# Patient Record
Sex: MALE | Race: OTHER | HISPANIC OR LATINO | URBAN - METROPOLITAN AREA
[De-identification: names, ages, dates, MRNs, and addresses within clinical notes are randomized per-mention and may not be internally consistent; named-entity substitution may affect disease eponyms.]

---

## 2022-06-13 ENCOUNTER — EMERGENCY (EMERGENCY)
Facility: HOSPITAL | Age: 22
LOS: 1 days | Discharge: HOME | End: 2022-06-13
Attending: EMERGENCY MEDICINE | Admitting: EMERGENCY MEDICINE
Payer: SELF-PAY

## 2022-06-13 VITALS
HEART RATE: 90 BPM | WEIGHT: 139.99 LBS | OXYGEN SATURATION: 100 % | RESPIRATION RATE: 17 BRPM | TEMPERATURE: 99 F | SYSTOLIC BLOOD PRESSURE: 126 MMHG | DIASTOLIC BLOOD PRESSURE: 72 MMHG

## 2022-06-13 DIAGNOSIS — R11.2 NAUSEA WITH VOMITING, UNSPECIFIED: ICD-10-CM

## 2022-06-13 DIAGNOSIS — R42 DIZZINESS AND GIDDINESS: ICD-10-CM

## 2022-06-13 DIAGNOSIS — R51.9 HEADACHE, UNSPECIFIED: ICD-10-CM

## 2022-06-13 LAB
ALBUMIN SERPL ELPH-MCNC: 5.2 G/DL — HIGH (ref 3.3–5)
ALP SERPL-CCNC: 95 U/L — SIGNIFICANT CHANGE UP (ref 40–120)
ALT FLD-CCNC: 21 U/L — SIGNIFICANT CHANGE UP (ref 10–45)
ANION GAP SERPL CALC-SCNC: 13 MMOL/L — SIGNIFICANT CHANGE UP (ref 5–17)
AST SERPL-CCNC: 19 U/L — SIGNIFICANT CHANGE UP (ref 10–40)
BASOPHILS # BLD AUTO: 0.04 K/UL — SIGNIFICANT CHANGE UP (ref 0–0.2)
BASOPHILS NFR BLD AUTO: 0.4 % — SIGNIFICANT CHANGE UP (ref 0–2)
BILIRUB SERPL-MCNC: 0.7 MG/DL — SIGNIFICANT CHANGE UP (ref 0.2–1.2)
BUN SERPL-MCNC: 8 MG/DL — SIGNIFICANT CHANGE UP (ref 7–23)
CALCIUM SERPL-MCNC: 9.5 MG/DL — SIGNIFICANT CHANGE UP (ref 8.4–10.5)
CHLORIDE SERPL-SCNC: 105 MMOL/L — SIGNIFICANT CHANGE UP (ref 96–108)
CO2 SERPL-SCNC: 27 MMOL/L — SIGNIFICANT CHANGE UP (ref 22–31)
CREAT SERPL-MCNC: 0.75 MG/DL — SIGNIFICANT CHANGE UP (ref 0.5–1.3)
EGFR: 132 ML/MIN/1.73M2 — SIGNIFICANT CHANGE UP
EOSINOPHIL # BLD AUTO: 0.05 K/UL — SIGNIFICANT CHANGE UP (ref 0–0.5)
EOSINOPHIL NFR BLD AUTO: 0.5 % — SIGNIFICANT CHANGE UP (ref 0–6)
GLUCOSE SERPL-MCNC: 91 MG/DL — SIGNIFICANT CHANGE UP (ref 70–99)
HCT VFR BLD CALC: 44.2 % — SIGNIFICANT CHANGE UP (ref 39–50)
HGB BLD-MCNC: 15.3 G/DL — SIGNIFICANT CHANGE UP (ref 13–17)
IMM GRANULOCYTES NFR BLD AUTO: 0.1 % — SIGNIFICANT CHANGE UP (ref 0–1.5)
LYMPHOCYTES # BLD AUTO: 1.48 K/UL — SIGNIFICANT CHANGE UP (ref 1–3.3)
LYMPHOCYTES # BLD AUTO: 16.1 % — SIGNIFICANT CHANGE UP (ref 13–44)
MCHC RBC-ENTMCNC: 31 PG — SIGNIFICANT CHANGE UP (ref 27–34)
MCHC RBC-ENTMCNC: 34.6 GM/DL — SIGNIFICANT CHANGE UP (ref 32–36)
MCV RBC AUTO: 89.7 FL — SIGNIFICANT CHANGE UP (ref 80–100)
MONOCYTES # BLD AUTO: 0.48 K/UL — SIGNIFICANT CHANGE UP (ref 0–0.9)
MONOCYTES NFR BLD AUTO: 5.2 % — SIGNIFICANT CHANGE UP (ref 2–14)
NEUTROPHILS # BLD AUTO: 7.14 K/UL — SIGNIFICANT CHANGE UP (ref 1.8–7.4)
NEUTROPHILS NFR BLD AUTO: 77.7 % — HIGH (ref 43–77)
NRBC # BLD: 0 /100 WBCS — SIGNIFICANT CHANGE UP (ref 0–0)
PLATELET # BLD AUTO: 238 K/UL — SIGNIFICANT CHANGE UP (ref 150–400)
POTASSIUM SERPL-MCNC: 4.2 MMOL/L — SIGNIFICANT CHANGE UP (ref 3.5–5.3)
POTASSIUM SERPL-SCNC: 4.2 MMOL/L — SIGNIFICANT CHANGE UP (ref 3.5–5.3)
PROT SERPL-MCNC: 7.7 G/DL — SIGNIFICANT CHANGE UP (ref 6–8.3)
RBC # BLD: 4.93 M/UL — SIGNIFICANT CHANGE UP (ref 4.2–5.8)
RBC # FLD: 12.3 % — SIGNIFICANT CHANGE UP (ref 10.3–14.5)
SODIUM SERPL-SCNC: 145 MMOL/L — SIGNIFICANT CHANGE UP (ref 135–145)
WBC # BLD: 9.2 K/UL — SIGNIFICANT CHANGE UP (ref 3.8–10.5)
WBC # FLD AUTO: 9.2 K/UL — SIGNIFICANT CHANGE UP (ref 3.8–10.5)

## 2022-06-13 PROCEDURE — 85025 COMPLETE CBC W/AUTO DIFF WBC: CPT

## 2022-06-13 PROCEDURE — 99284 EMERGENCY DEPT VISIT MOD MDM: CPT | Mod: 25

## 2022-06-13 PROCEDURE — 36415 COLL VENOUS BLD VENIPUNCTURE: CPT

## 2022-06-13 PROCEDURE — 80053 COMPREHEN METABOLIC PANEL: CPT

## 2022-06-13 PROCEDURE — 71046 X-RAY EXAM CHEST 2 VIEWS: CPT | Mod: 26

## 2022-06-13 PROCEDURE — 96374 THER/PROPH/DIAG INJ IV PUSH: CPT

## 2022-06-13 PROCEDURE — 71046 X-RAY EXAM CHEST 2 VIEWS: CPT

## 2022-06-13 RX ORDER — METOCLOPRAMIDE HCL 10 MG
10 TABLET ORAL ONCE
Refills: 0 | Status: DISCONTINUED | OUTPATIENT
Start: 2022-06-13 | End: 2022-06-13

## 2022-06-13 RX ORDER — SODIUM CHLORIDE 9 MG/ML
1000 INJECTION INTRAMUSCULAR; INTRAVENOUS; SUBCUTANEOUS ONCE
Refills: 0 | Status: COMPLETED | OUTPATIENT
Start: 2022-06-13 | End: 2022-06-13

## 2022-06-13 RX ORDER — METOCLOPRAMIDE HCL 10 MG
10 TABLET ORAL ONCE
Refills: 0 | Status: COMPLETED | OUTPATIENT
Start: 2022-06-13 | End: 2022-06-13

## 2022-06-13 RX ADMIN — Medication 104 MILLIGRAM(S): at 12:26

## 2022-06-13 RX ADMIN — SODIUM CHLORIDE 1000 MILLILITER(S): 9 INJECTION INTRAMUSCULAR; INTRAVENOUS; SUBCUTANEOUS at 12:26

## 2022-06-13 NOTE — ED PROVIDER NOTE - NS ED ATTENDING STATEMENT MOD
This was a shared visit with the MARYANN. I reviewed and verified the documentation and independently performed the documented:

## 2022-06-13 NOTE — ED PROVIDER NOTE - CLINICAL SUMMARY MEDICAL DECISION MAKING FREE TEXT BOX
22 y/o m presents c/o n/v over the past 3 days, lightheadedness and headache over the past 2 days.  Pt afebrile in ED, vss, neuro exam intact.  Labs unremarkable, sx improved with reglan, IV fluid.  Pt stable for d/c, will continue oral hydration, return to ED if sx worsen.

## 2022-06-13 NOTE — ED ADULT NURSE NOTE - CHIEF COMPLAINT QUOTE
Pt presents to ED c/o headache, diarrhea, nausea x 3 days. Denies fevers, chills, sore throat, cough, dizziness, cp.  #[id number].

## 2022-06-13 NOTE — ED PROVIDER NOTE - PATIENT PORTAL LINK FT
You can access the FollowMyHealth Patient Portal offered by [facility] by registering at the following website: [email or web address]. By joining [employer]’s FollowMyHealth portal, you will also be able to view your health information using other applications (apps) compatible with our system.

## 2022-06-13 NOTE — ED PROVIDER NOTE - NSFOLLOWUPINSTRUCTIONS_ED_ALL_ED_FT
Mareos    Dizziness      Los mareos son un problema muy frecuente. Causan sensación de inestabilidad o de desvanecimiento. Puede sentir que se va a desmayar. Los mareos pueden provocarle calvin lesión si se tropieza o se . La causa puede deberse a muchos problemas, tales alireza los siguientes:  •Medicamentos.      •No tener suficiente agua en el cuerpo (deshidratación).      •Enfermedad.        Siga estas instrucciones en petersen casa:      Comida y bebida   A comparison of three sample cups showing dark yellow, yellow, and pale yellow urine.   •Selena suficiente líquido para mantener el pis (orina) de color amarillo pálido. [place] nadia la deshidratación. Trate de beber más líquidos transparentes, alireza agua.      • No selena alcohol.      •Limite la cantidad de cafeína que francisco o come si el médico se lo indica.      •Limite la cantidad de sal (sodio) que francisco o come si el médico se lo indica.        Actividad   A sign showing that a person should not drive. •Evite los movimientos rápidos.  •Cuando se levante de calvin silla, hágalo con lentitud y sujétese hasta sentirse madison.      •Por la mañana, siéntese abiola a un lado de la cama. Cuando se sienta madison, póngase lentamente de pie mientras se sostiene de algo. Ángela esto hasta que se sienta seguro en cuanto al equilibrio.        •Mueva las piernas con frecuencia si debe estar de pie en un lugar jessa mucho tiempo. Mientras esté de pie, contraiga y relaje los músculos de las piernas.      • No conduzca vehículos ni opere maquinaria si se siente mareado.      •Evite agacharse si se siente mareado. En petersen casa, coloque los objetos en algún lugar que le resulte fácil alcanzarlos sin agacharse.      Estilo de rachid     • No fume ni consuma ningún producto que contenga nicotina o tabaco. Si necesita ayuda para dejar de fumar, consulte al médico.      •Intente bajar el nivel de estrés. Para hacerlo, puede usar métodos alireza el yoga o la meditación. Hable con el médico si necesita ayuda.      Instrucciones generales     •Controle timothy mareos para leighann si hay cambios.      •Use los medicamentos de venta quinton y los recetados solamente alireza se lo haya indicado el médico. Hable con el médico si christel que la causa de timothy mareos es algún medicamento que está tomando.      •Infórmele a un amigo o a un familiar si se siente mareado. Pídale a esta persona que llame al médico si observa cambios en petersen comportamiento.      •Concurra a todas las visitas de seguimiento.        Comuníquese con un médico si:    •Los mareos persisten.      •Los mareos o la sensación de desvanecimiento empeoran.      •Tiene ganas de vomitar (náuseas).      •Tiene problemas para escuchar.      •Aparecen nuevos síntomas.      •Siente inestabilidad al estar de pie.      •Siente que la habitación da vueltas.      •Dolor o rigidez en el farzad.      •Tiene fiebre.        Solicite ayuda de inmediato si:    •Vomita o tiene heces acuosas (diarrea), y no puede comer o beber nada.    •Tiene dificultad para hacer lo siguiente:  •Hablar.      •Caminar.      •Tragar.      •Usar los brazos, las faustina o las piernas.        •Se siente constantemente débil.      •No piensa con claridad o tiene dificultad para armar oraciones. Es posible que un amigo o un familiar adviertan que esto ocurre.    •Tiene los siguientes síntomas:  •Dolor en el pecho.      •Dolor en el vientre (abdomen).      •Falta de aire.      •Sudoración.        •Presenta cambios en la visión.      •Sangrado.      •Tiene un dolor de martha muy intenso.      Estos síntomas pueden indicar calvin emergencia. Solicite ayuda de inmediato. Comuníquese con el servicio de emergencias de petersen localidad (911 en los [place]).   • No espere a leighann si los síntomas desaparecen.        • No conduzca por timothy propios medios hasta el hospital.         Resumen    •Los mareos causan sensación de inestabilidad o de desvanecimiento. Puede sentir que se va a desmayar.      •Selena suficiente líquido para mantener el pis (orina) de color amarillo pálido. No selena alcohol.      •Evite los movimientos rápidos si se siente mareado.      •Controle timothy mareos para leighann si hay cambios.      Esta información no tiene alireza fin reemplazar el consejo del médico. Asegúrese de hacerle al médico cualquier pregunta que tenga.

## 2022-06-13 NOTE — ED PROVIDER NOTE - ATTENDING APP SHARED VISIT CONTRIBUTION OF CARE
Vitals wnl, exam as above. Albumin 5.2, other labs grossly wnl. CXR wnl. Received IVF/reglan, feeling much better.   Discussed importance of outpt follow up and return precautions. Clinically no indication for further emergent ED workup or hospitalization at this time. Comfortable for dc, outpt f/u.

## 2022-06-13 NOTE — ED PROVIDER NOTE - OBJECTIVE STATEMENT
#[id number], [id number]    22 y/o m presents c/o n/v over the past 4 days.  Pt stating over the past 3 days, has been lightheaded, having intermittent headache as well.  Pt stating he hasn't been eating/drinking much because of his nausea, hasn't eaten anything today.  Pt reports was feeling lightheaded today, felt he may faint but didn't lose consciousness.  Denies fever, chills, CP, abd pain, diarrhea, all other ROS negative.

## 2022-06-13 NOTE — ED ADULT TRIAGE NOTE - CHIEF COMPLAINT QUOTE
Pt presents to ED c/o headache, diarrhea, nausea x 3 days. Denies fevers, chills, sore throat, cough, dizziness, cp.  #[id number].

## 2022-10-29 ENCOUNTER — EMERGENCY (EMERGENCY)
Facility: HOSPITAL | Age: 22
LOS: 1 days | Discharge: HOME | End: 2022-10-29
Attending: EMERGENCY MEDICINE | Admitting: EMERGENCY MEDICINE
Payer: SELF-PAY

## 2022-10-29 VITALS
WEIGHT: 160.06 LBS | RESPIRATION RATE: 16 BRPM | SYSTOLIC BLOOD PRESSURE: 160 MMHG | HEART RATE: 68 BPM | TEMPERATURE: 98 F | DIASTOLIC BLOOD PRESSURE: 82 MMHG | HEIGHT: 62.99 IN | OXYGEN SATURATION: 96 %

## 2022-10-29 VITALS
RESPIRATION RATE: 16 BRPM | SYSTOLIC BLOOD PRESSURE: 128 MMHG | OXYGEN SATURATION: 99 % | DIASTOLIC BLOOD PRESSURE: 79 MMHG | TEMPERATURE: 98 F | HEART RATE: 72 BPM

## 2022-10-29 LAB
ALBUMIN SERPL ELPH-MCNC: 5.3 G/DL — HIGH (ref 3.3–5)
ALP SERPL-CCNC: 125 U/L — HIGH (ref 40–120)
ALT FLD-CCNC: 25 U/L — SIGNIFICANT CHANGE UP (ref 10–45)
ANION GAP SERPL CALC-SCNC: 12 MMOL/L — SIGNIFICANT CHANGE UP (ref 5–17)
AST SERPL-CCNC: 25 U/L — SIGNIFICANT CHANGE UP (ref 10–40)
BASOPHILS # BLD AUTO: 0.02 K/UL — SIGNIFICANT CHANGE UP (ref 0–0.2)
BASOPHILS NFR BLD AUTO: 0.2 % — SIGNIFICANT CHANGE UP (ref 0–2)
BILIRUB SERPL-MCNC: 0.6 MG/DL — SIGNIFICANT CHANGE UP (ref 0.2–1.2)
BUN SERPL-MCNC: 6 MG/DL — LOW (ref 7–23)
CALCIUM SERPL-MCNC: 9.7 MG/DL — SIGNIFICANT CHANGE UP (ref 8.4–10.5)
CHLORIDE SERPL-SCNC: 101 MMOL/L — SIGNIFICANT CHANGE UP (ref 96–108)
CO2 SERPL-SCNC: 26 MMOL/L — SIGNIFICANT CHANGE UP (ref 22–31)
CREAT SERPL-MCNC: 0.74 MG/DL — SIGNIFICANT CHANGE UP (ref 0.5–1.3)
EGFR: 131 ML/MIN/1.73M2 — SIGNIFICANT CHANGE UP
EOSINOPHIL # BLD AUTO: 0.06 K/UL — SIGNIFICANT CHANGE UP (ref 0–0.5)
EOSINOPHIL NFR BLD AUTO: 0.7 % — SIGNIFICANT CHANGE UP (ref 0–6)
GLUCOSE SERPL-MCNC: 96 MG/DL — SIGNIFICANT CHANGE UP (ref 70–99)
HCT VFR BLD CALC: 42.8 % — SIGNIFICANT CHANGE UP (ref 39–50)
HGB BLD-MCNC: 14.7 G/DL — SIGNIFICANT CHANGE UP (ref 13–17)
IMM GRANULOCYTES NFR BLD AUTO: 0.2 % — SIGNIFICANT CHANGE UP (ref 0–0.9)
LIDOCAIN IGE QN: 30 U/L — SIGNIFICANT CHANGE UP (ref 7–60)
LYMPHOCYTES # BLD AUTO: 1.7 K/UL — SIGNIFICANT CHANGE UP (ref 1–3.3)
LYMPHOCYTES # BLD AUTO: 20.4 % — SIGNIFICANT CHANGE UP (ref 13–44)
MCHC RBC-ENTMCNC: 30.7 PG — SIGNIFICANT CHANGE UP (ref 27–34)
MCHC RBC-ENTMCNC: 34.3 GM/DL — SIGNIFICANT CHANGE UP (ref 32–36)
MCV RBC AUTO: 89.4 FL — SIGNIFICANT CHANGE UP (ref 80–100)
MONOCYTES # BLD AUTO: 0.86 K/UL — SIGNIFICANT CHANGE UP (ref 0–0.9)
MONOCYTES NFR BLD AUTO: 10.3 % — SIGNIFICANT CHANGE UP (ref 2–14)
NEUTROPHILS # BLD AUTO: 5.69 K/UL — SIGNIFICANT CHANGE UP (ref 1.8–7.4)
NEUTROPHILS NFR BLD AUTO: 68.2 % — SIGNIFICANT CHANGE UP (ref 43–77)
NRBC # BLD: 0 /100 WBCS — SIGNIFICANT CHANGE UP (ref 0–0)
PLATELET # BLD AUTO: 248 K/UL — SIGNIFICANT CHANGE UP (ref 150–400)
POTASSIUM SERPL-MCNC: 3.8 MMOL/L — SIGNIFICANT CHANGE UP (ref 3.5–5.3)
POTASSIUM SERPL-SCNC: 3.8 MMOL/L — SIGNIFICANT CHANGE UP (ref 3.5–5.3)
PROT SERPL-MCNC: 7.5 G/DL — SIGNIFICANT CHANGE UP (ref 6–8.3)
RBC # BLD: 4.79 M/UL — SIGNIFICANT CHANGE UP (ref 4.2–5.8)
RBC # FLD: 12.4 % — SIGNIFICANT CHANGE UP (ref 10.3–14.5)
SODIUM SERPL-SCNC: 139 MMOL/L — SIGNIFICANT CHANGE UP (ref 135–145)
TROPONIN T SERPL-MCNC: <0.01 NG/ML — SIGNIFICANT CHANGE UP (ref 0–0.01)
WBC # BLD: 8.35 K/UL — SIGNIFICANT CHANGE UP (ref 3.8–10.5)
WBC # FLD AUTO: 8.35 K/UL — SIGNIFICANT CHANGE UP (ref 3.8–10.5)

## 2022-10-29 PROCEDURE — 71045 X-RAY EXAM CHEST 1 VIEW: CPT | Mod: 26

## 2022-10-29 PROCEDURE — 96374 THER/PROPH/DIAG INJ IV PUSH: CPT

## 2022-10-29 PROCEDURE — 71045 X-RAY EXAM CHEST 1 VIEW: CPT

## 2022-10-29 PROCEDURE — 93005 ELECTROCARDIOGRAM TRACING: CPT

## 2022-10-29 PROCEDURE — 80053 COMPREHEN METABOLIC PANEL: CPT

## 2022-10-29 PROCEDURE — 99285 EMERGENCY DEPT VISIT HI MDM: CPT | Mod: 25

## 2022-10-29 PROCEDURE — 36415 COLL VENOUS BLD VENIPUNCTURE: CPT

## 2022-10-29 PROCEDURE — 96361 HYDRATE IV INFUSION ADD-ON: CPT

## 2022-10-29 PROCEDURE — 84484 ASSAY OF TROPONIN QUANT: CPT

## 2022-10-29 PROCEDURE — 85025 COMPLETE CBC W/AUTO DIFF WBC: CPT

## 2022-10-29 PROCEDURE — 93010 ELECTROCARDIOGRAM REPORT: CPT

## 2022-10-29 PROCEDURE — 83690 ASSAY OF LIPASE: CPT

## 2022-10-29 RX ORDER — KETOROLAC TROMETHAMINE 30 MG/ML
15 SYRINGE (ML) INJECTION ONCE
Refills: 0 | Status: DISCONTINUED | OUTPATIENT
Start: 2022-10-29 | End: 2022-10-29

## 2022-10-29 RX ORDER — SODIUM CHLORIDE 9 MG/ML
1000 INJECTION INTRAMUSCULAR; INTRAVENOUS; SUBCUTANEOUS ONCE
Refills: 0 | Status: COMPLETED | OUTPATIENT
Start: 2022-10-29 | End: 2022-10-29

## 2022-10-29 RX ADMIN — Medication 15 MILLIGRAM(S): at 17:43

## 2022-10-29 RX ADMIN — Medication 15 MILLIGRAM(S): at 17:09

## 2022-10-29 RX ADMIN — SODIUM CHLORIDE 1000 MILLILITER(S): 9 INJECTION INTRAMUSCULAR; INTRAVENOUS; SUBCUTANEOUS at 17:13

## 2022-10-29 RX ADMIN — SODIUM CHLORIDE 1000 MILLILITER(S): 9 INJECTION INTRAMUSCULAR; INTRAVENOUS; SUBCUTANEOUS at 17:44

## 2022-10-29 NOTE — ED PROVIDER NOTE - NSFOLLOWUPINSTRUCTIONS_ED_ALL_ED_FT
Trastorno de ansiedad generalizada, en adultos    Generalized Anxiety Disorder, Adult      El trastorno de ansiedad generalizada (TAG) es calvin afección de rosy mental. A diferencia de las preocupaciones normales, la ansiedad relacionada con el TAG no se desencadena por un acontecimiento específico. Estas preocupaciones no desaparecen ni mejoran con el tiempo. EL TAG interfiere con las relaciones, el trabajo y los estudios.    Los síntomas del TAG pueden variar de leves a graves. Las personas con TAG grave pueden tener intensas oleadas de ansiedad con síntomas físicos que son similares a las crisis de angustia.      ¿Cuáles son las causas?    Se desconoce la causa exacta del TAG, damaris se christel que influyen los siguientes factores:  •Diferencias en las sustancias químicas naturales del cerebro.      •Genes que se transmiten de padres a hijos.      •Diferencias en la forma en que se perciben las amenazas.      •Desarrollo y estrés jessa la infancia.      •La personalidad.        ¿Qué incrementa el riesgo?    Los siguientes factores pueden hacer que sea más propenso a desarrollar esta afección:  •Ser poonam.      •Tener antecedentes familiares de trastornos de ansiedad.      •Ser muy tímido.      •Experimentar acontecimientos muy estresantes en la rachid, alireza la muerte de un ser querido.      •Tener un ambiente familiar muy estresante.        ¿Cuáles son los signos o síntomas?    Con frecuencia, las personas que padecen el TAG se preocupan excesivamente por muchas cosas en la rachid, tales alireza petersen rosy y petersen salvatore. Otros síntomas son:•Síntomas mentales y emocionales:  •Preocupación excesiva acerca de los desastres naturales.      •Miedo a llegar tarde.      •Dificultad para concentrarse.      •Temor de que otras personas juzguen petersen desempeño.      •Síntomas físicos:  •Fatiga.      •Tricia de martha, tensión muscular, contracciones musculares, temblores o sensación de tambalearse.      •Sensación de que el corazón late muy rápido.      •Sentir falta de aire o alireza que no se puede respirar profundamente.      •Problemas para conciliar el sueño o para seguir durmiendo, o sensación de agitación.      •Sudoración.      •Náuseas, diarrea o síndrome de colon irritable (SCI).      •Síntomas de la conducta:  •Tener estados de ánimo cambiantes o irritabilidad.      •Evitar situaciones nuevas.      •Evitar a las personas.      •Dificultad extrema para joann decisiones.          ¿Cómo se diagnostica?    Esta afección se diagnostica en función de los síntomas y los antecedentes médicos. Además, se le realizará un examen físico. El médico puede hacerle algunas pruebas para descartar que timothy síntomas tengan otras causas.    Para recibir un diagnóstico del TAG, calvin persona debe tener ansiedad que:  •Esté fuera de petersen control.      •Afecte distintos aspectos de petersen rachid, alireza el trabajo y las relaciones.      •Cause angustia que le impida participar en timothy actividades habituales.      •Incluya al menos sunni síntomas del TAG, tales alireza desasosiego, fatiga, dificultad para concentrarse, irritabilidad, tensión muscular o problemas para dormir.      Antes de que petersen médico pueda confirmar el diagnóstico de TAG, estos síntomas deben estar presentes más días de los que no lo están y deben tener calvni duración de seis meses o más.      ¿Cómo se trata?  A person talking with a mental health specialist.    El tratamiento de esta afección puede incluir:  •Medicamentos. Por lo general, los medicamentos antidepresivos se recetan para un control diario a ethan plazo. Se pueden agregar medicamentos para la ansiedad en casos graves, especialmente cuando ocurren crisis de angustia.    •Psicoterapia (psicoanálisis). Determinados tipos de psicoterapia pueden ser útiles para tratar el TAG al brindar apoyo, educación y orientación. Entre las opciones se incluyen las siguientes:  •Terapia cognitivo conductual (TCC). Las personas aprenden habilidades para afrontar situaciones y técnicas para poder calmarse para aliviar timothy síntomas físicos. Aprenden a identificar comportamientos y pensamientos no realistas y a reemplazarlos por comportamientos y pensamientos más adecuados.      •Terapia de aceptación y compromiso (acceptance and commitment therapy, ACT). Blanche tratamiento enseña a las personas a ser conscientes alireza calvin forma de lidiar con pensamientos y sentimientos no deseados.      •Biorretroalimentación. Blanche proceso lo capacita para controlar la respuesta del cuerpo (respuesta psicológica) a través de técnicas de respiración y métodos de relajación. Usted trabajará con un terapeuta mientras se usan máquinas para controlar timothy síntomas físicos.        •Técnicas para controlar el estrés. Estas incluyen yoga, meditación y ejercicio.      Un especialista en rosy mental puede ayudar a determinar qué tratamiento es el mejor para usted. Algunas personas pueden mejorar con solo un tipo de terapia. Sin embargo, otras personas requieren calvin combinación de terapias.      Siga estas instrucciones en petersen casa:    Estilo de rachid     •Mantenga horarios y calvin rutina uniforme.      •Prevea las situaciones estresantes. Elabore un plan y reserve tiempo adicional para trabajar con petersen plan.      •Practique técnicas de control del estrés o técnicas para calmarse que petersen terapeuta o petersen médico le haya enseñado.      •Ángela actividad física con frecuencia y pase tiempo al aire quinton.    •Siga calvin dieta saludable que incluya abundantes frutas, verduras, cereales integrales, productos lácteos descremados y proteínas magras.  •No consuma muchos alimentos con alto contenido de grasas, azúcar agregado o sal (sodio).      •Selena abundante agua.        •Evite joann alcohol. El alcohol puede aumentar la ansiedad.      •Evite el consumo de cafeína y ciertos medicamentos de venta quinton contra el resfrío. Estos podrían hacerlo sentir peor. Pregúntele al farmacéutico qué medicamentos no debería joann.      Instrucciones generales     •Use los medicamentos de venta quinton y los recetados solamente alireza se lo haya indicado el médico.      •Comprenda que es probable que tenga retrocesos. Acepte esto y sea shaista con usted mismo mientras continúa cuidándose mejor.      •Prevea las situaciones estresantes. Elabore un plan y reserve tiempo adicional para trabajar con petersen plan.      •Reconozca y acepte timothy logros, aunque le parezcan pequeños.      •Pase tiempo con personas que se preocupan por usted.      •Concurra a todas las visitas de seguimiento. [place] es importante.        Dónde obtener más información    [employer] [place] of Mental Health (Instituto Nacional de la Rosy Mental): www.nimh.nih.gov    Substance Abuse and Mental Health Services (Servicios por Abuso de Sustancias y Rosy Mental): www.samhsa.gov      Comuníquese con un médico si:    •Los síntomas no mejoran.      •Los síntomas empeoran.    •Tiene signos de depresión tales alireza:  •Un estado de ánimo constantemente curt o irritable.      •Ya no disfruta de actividades que le solían causar placer.      •Cambios en el peso o en timothy hábitos de alimentación.      •Cambios en los hábitos de sueño.          Solicite ayuda de inmediato si:    •Tiene pensamientos acerca de lastimarse a usted mismo o a otras personas.      Si alguna vez siente que puede lastimarse o lastimar a otras personas, o tiene pensamientos de poner fin a petersen rachid, busque ayuda de inmediato. Diríjase al servicio de urgencias más cercano o:   • Comuníquese con el servicio de emergencias de petersen localidad (911 en los [place]).       • Llame a calvin línea de asistencia al suicida y atención en crisis alireza National Suicide Prevention Lifeline (Línea Nacional de Prevención del Suicidio) al [phone]. Está disponible las 24 horas del día en los . [place].       • Envíe un mensaje de texto a la línea para casos de crisis al [id number] (en los . [place]         Resumen    •El trastorno de ansiedad generalizada (TAG) es calvin afección de rosy mental que implica preocupación no desencadenada por un acontecimiento específico.      •Con frecuencia, las personas que padecen el TAG se preocupan excesivamente por muchas cosas en la rachid, tales alireza petersen rosy y petersen salvatore.      •El TAG puede causar síntomas tales alireza agitación, dificultad para concentrarse, problemas para dormir, sudoración frecuente, náuseas, diarrea, tricia de martha y temblores o contracciones musculares.      •Un especialista en rosy mental puede ayudar a determinar qué tratamiento es el mejor para usted. Algunas personas pueden mejorar con solo un tipo de terapia. Sin embargo, otras personas requieren calvin combinación de terapias.      Esta información no tiene alireza fin reemplazar el consejo del médico. Asegúrese de hacerle al médico cualquier pregunta que tenga.      Document Revised: 04/28/2022 Document Reviewed: 04/28/2022    Elsevier Patient Education © 2022 [employer]vier Inc.

## 2022-10-29 NOTE — ED PROVIDER NOTE - OBJECTIVE STATEMENT
22 m co ha, feeling like he couldn't get a full breath in dizziness- px works as a food prep- feels like his boss is very steel- very rude- causes him a lot of stress - no syncope- pmh high lipids- takes omega 3  co ha- throbbing frontal ha  took a thc gummie 3 months ago- made him very anixous and fearful- still w occ fears/anxiety  mod severiy

## 2022-10-29 NOTE — ED PROVIDER NOTE - PATIENT PORTAL LINK FT
You can access the FollowMyHealth Patient Portal offered by [facility] by registering at the following website: [email or web address]. By joining [employer]’s FollowMyHealth portal, you will also be able to view your health information using other applications (apps) compatible with our system.

## 2022-10-29 NOTE — ED ADULT NURSE NOTE - OBJECTIVE STATEMENT
21 y/o male c/o intermittent headaches for 3 months since taking a "marijuana gummy" pt also c/o palpitations, states taking omega 3 capsules.

## 2022-10-29 NOTE — ED ADULT TRIAGE NOTE - ACCOMPANIED BY
Dr. Castrejon     [facility] calling to recert x 60 days:      -Nursing monthly checks-     -HHA weekly     - Sept 27th- CMP, CBC, Phos, Magesium, Ferritin, Zinc     Okay to call back with verbal orders and leave a message @ [phone] ( mina)       Elayne Henry RN         Self

## 2022-10-29 NOTE — ED PROVIDER NOTE - CLINICAL SUMMARY MEDICAL DECISION MAKING FREE TEXT BOX
px w ha - elevated BP- but bp improved with tylenol- hold off on starting antihypertensive- rec fu pcp/psychologist

## 2022-10-31 DIAGNOSIS — F41.9 ANXIETY DISORDER, UNSPECIFIED: ICD-10-CM

## 2022-10-31 DIAGNOSIS — R51.9 HEADACHE, UNSPECIFIED: ICD-10-CM

## 2022-10-31 DIAGNOSIS — E78.00 PURE HYPERCHOLESTEROLEMIA, UNSPECIFIED: ICD-10-CM

## 2022-10-31 DIAGNOSIS — G43.909 MIGRAINE, UNSPECIFIED, NOT INTRACTABLE, WITHOUT STATUS MIGRAINOSUS: ICD-10-CM

## 2022-10-31 DIAGNOSIS — R03.0 ELEVATED BLOOD-PRESSURE READING, WITHOUT DIAGNOSIS OF HYPERTENSION: ICD-10-CM
